# Patient Record
Sex: MALE | Race: NATIVE HAWAIIAN OR OTHER PACIFIC ISLANDER | NOT HISPANIC OR LATINO
[De-identification: names, ages, dates, MRNs, and addresses within clinical notes are randomized per-mention and may not be internally consistent; named-entity substitution may affect disease eponyms.]

---

## 2016-05-17 RX ORDER — OMEPRAZOLE 10 MG/1
1 CAPSULE, DELAYED RELEASE ORAL
Qty: 0 | Refills: 0 | COMMUNITY
Start: 2016-05-17 | End: 2016-06-16

## 2016-05-17 RX ORDER — TERAZOSIN HYDROCHLORIDE 10 MG/1
1 CAPSULE ORAL
Qty: 0 | Refills: 0 | COMMUNITY
Start: 2016-05-17 | End: 2016-06-16

## 2017-08-22 ENCOUNTER — APPOINTMENT (OUTPATIENT)
Dept: CARDIOTHORACIC SURGERY | Facility: CLINIC | Age: 67
End: 2017-08-22

## 2019-02-27 ENCOUNTER — APPOINTMENT (OUTPATIENT)
Dept: UROLOGY | Facility: CLINIC | Age: 69
End: 2019-02-27
Payer: MEDICARE

## 2019-02-27 VITALS
SYSTOLIC BLOOD PRESSURE: 124 MMHG | BODY MASS INDEX: 26.98 KG/M2 | WEIGHT: 158 LBS | RESPIRATION RATE: 16 BRPM | HEART RATE: 78 BPM | HEIGHT: 64 IN | DIASTOLIC BLOOD PRESSURE: 72 MMHG

## 2019-02-27 PROCEDURE — 99204 OFFICE O/P NEW MOD 45 MIN: CPT

## 2019-03-19 NOTE — HISTORY OF PRESENT ILLNESS
[FreeTextEntry1] : cc bph\par pt is  yo male referred for bph . The patient reports frequency, incomplete emptying, intermittency, straining, urgency and weak stream.  . He denies  hematuria and dysuria  The patient states symptoms are of  severity. The symptoms have been present for . He reports a history of no complicating symptoms. He denies flank pain, gross hematuria, kidney stones and recurrent UTI.\par  Prior treatments include terazosin . Results of treatment are poor\par last psa 3.3\par The is no family history of prostate cancer\par eval cysto 2015 large median lobe\par

## 2019-03-19 NOTE — ASSESSMENT
[FreeTextEntry1] : We had a long discussion regarding the urinary symptoms and management options.. Surgical options discussed included photo-vaporization of the prostate also known as Greenlight laser, microwave thermotherapy, transurethral resection of the prostate (TURP) or suprapubic prostatectomy depending on the size of prostate. We also discussed intra-vesical injection of Botox for overactive bladder symptoms. Risks and benefits of each treatment were discussed included but were not limited to worsening urinary symptoms, incontinence, hematuria, erectile dysfunction, ED, retrograde ejaculation, etc. Also, workup with cystoscopy, post-void residual determination and urodynamics study and their indications were reviewed.\par will schedule turp \par

## 2019-04-18 ENCOUNTER — OUTPATIENT (OUTPATIENT)
Dept: OUTPATIENT SERVICES | Facility: HOSPITAL | Age: 69
LOS: 1 days | End: 2019-04-18
Payer: MEDICARE

## 2019-04-18 VITALS
RESPIRATION RATE: 17 BRPM | HEART RATE: 62 BPM | OXYGEN SATURATION: 98 % | DIASTOLIC BLOOD PRESSURE: 70 MMHG | TEMPERATURE: 98 F | HEIGHT: 64 IN | WEIGHT: 149.91 LBS | SYSTOLIC BLOOD PRESSURE: 115 MMHG

## 2019-04-18 DIAGNOSIS — Z98.890 OTHER SPECIFIED POSTPROCEDURAL STATES: Chronic | ICD-10-CM

## 2019-04-18 DIAGNOSIS — Z98.49 CATARACT EXTRACTION STATUS, UNSPECIFIED EYE: Chronic | ICD-10-CM

## 2019-04-18 DIAGNOSIS — N40.1 BENIGN PROSTATIC HYPERPLASIA WITH LOWER URINARY TRACT SYMPTOMS: ICD-10-CM

## 2019-04-18 DIAGNOSIS — Z98.89 OTHER SPECIFIED POSTPROCEDURAL STATES: Chronic | ICD-10-CM

## 2019-04-18 LAB
APPEARANCE UR: CLEAR — SIGNIFICANT CHANGE UP
APTT BLD: 27.9 SEC — SIGNIFICANT CHANGE UP (ref 27.5–36.3)
BILIRUB UR-MCNC: NEGATIVE — SIGNIFICANT CHANGE UP
COLOR SPEC: YELLOW — SIGNIFICANT CHANGE UP
DIFF PNL FLD: NEGATIVE — SIGNIFICANT CHANGE UP
GLUCOSE UR QL: NEGATIVE — SIGNIFICANT CHANGE UP
INR BLD: 1.05 RATIO — SIGNIFICANT CHANGE UP (ref 0.88–1.16)
KETONES UR-MCNC: ABNORMAL
LEUKOCYTE ESTERASE UR-ACNC: NEGATIVE — SIGNIFICANT CHANGE UP
NITRITE UR-MCNC: NEGATIVE — SIGNIFICANT CHANGE UP
PH UR: 6 — SIGNIFICANT CHANGE UP (ref 5–8)
PROT UR-MCNC: 15
PROTHROM AB SERPL-ACNC: 11.7 SEC — SIGNIFICANT CHANGE UP (ref 10–12.9)
SP GR SPEC: 1.01 — SIGNIFICANT CHANGE UP (ref 1.01–1.02)
UROBILINOGEN FLD QL: NEGATIVE — SIGNIFICANT CHANGE UP

## 2019-04-18 PROCEDURE — G0463: CPT

## 2019-04-18 PROCEDURE — 36415 COLL VENOUS BLD VENIPUNCTURE: CPT

## 2019-04-18 PROCEDURE — 81001 URINALYSIS AUTO W/SCOPE: CPT

## 2019-04-18 PROCEDURE — 86900 BLOOD TYPING SEROLOGIC ABO: CPT

## 2019-04-18 PROCEDURE — 85730 THROMBOPLASTIN TIME PARTIAL: CPT

## 2019-04-18 PROCEDURE — 71046 X-RAY EXAM CHEST 2 VIEWS: CPT

## 2019-04-18 PROCEDURE — 86850 RBC ANTIBODY SCREEN: CPT

## 2019-04-18 PROCEDURE — 71046 X-RAY EXAM CHEST 2 VIEWS: CPT | Mod: 26

## 2019-04-18 PROCEDURE — 85610 PROTHROMBIN TIME: CPT

## 2019-04-18 PROCEDURE — 87086 URINE CULTURE/COLONY COUNT: CPT

## 2019-04-18 PROCEDURE — 86901 BLOOD TYPING SEROLOGIC RH(D): CPT

## 2019-04-18 RX ORDER — METOPROLOL TARTRATE 50 MG
0.5 TABLET ORAL
Qty: 0 | Refills: 0 | COMMUNITY

## 2019-04-18 RX ORDER — SODIUM CHLORIDE 9 MG/ML
3 INJECTION INTRAMUSCULAR; INTRAVENOUS; SUBCUTANEOUS EVERY 8 HOURS
Qty: 0 | Refills: 0 | Status: DISCONTINUED | OUTPATIENT
Start: 2019-04-22 | End: 2019-04-23

## 2019-04-18 NOTE — H&P PST ADULT - NSICDXFAMILYHX_GEN_ALL_CORE_FT
FAMILY HISTORY:  Sibling  Still living? Unknown  Family history of diabetes mellitus in brother, Age at diagnosis: Age Unknown  Family history of hyperlipidemia, Age at diagnosis: Age Unknown

## 2019-04-18 NOTE — H&P PST ADULT - NSICDXPASTSURGICALHX_GEN_ALL_CORE_FT
PAST SURGICAL HISTORY:  History of arthroscopic knee surgery Bilateral-2012    S/P cataract extraction bilateral eyes with artificial lenses- 2017    S/P mitral valve repair 2016

## 2019-04-18 NOTE — H&P PST ADULT - NSICDXPROBLEM_GEN_ALL_CORE_FT
PROBLEM DIAGNOSES  Problem: Enlarged prostate with lower urinary tract symptoms (LUTS)  Assessment and Plan: Patient is scheduled for transurethral resection of prostate on 4/22/19.

## 2019-04-18 NOTE — H&P PST ADULT - NEGATIVE ENMT SYMPTOMS
no recurrent cold sores/no vertigo/no nasal congestion/no nasal obstruction/no abnormal taste sensation/no nasal discharge/no post-nasal discharge/no nose bleeds/no dry mouth/no ear pain/no throat pain/no dysphagia/no hearing difficulty/no tinnitus/no sinus symptoms/no gum bleeding

## 2019-04-18 NOTE — H&P PST ADULT - NEGATIVE GENERAL GENITOURINARY SYMPTOMS
no flank pain L/no urine discoloration/no hematuria/no dysuria/no renal colic/no flank pain R/BPH/no bladder infections

## 2019-04-18 NOTE — H&P PST ADULT - NEGATIVE GASTROINTESTINAL SYMPTOMS
no nausea/no vomiting/no abdominal pain/no flatulence/no change in bowel habits/no constipation/no diarrhea

## 2019-04-18 NOTE — H&P PST ADULT - NSICDXPASTMEDICALHX_GEN_ALL_CORE_FT
PAST MEDICAL HISTORY:  BPH (benign prostatic hyperplasia)     Cataract bilateral eyes    Chronic GERD     Diabetes mellitus     Hyperlipidemia     Hypertension     Mitral regurgitation

## 2019-04-18 NOTE — H&P PST ADULT - GASTROINTESTINAL DETAILS
no guarding/nontender/no distention/soft/no masses palpable/bowel sounds normal/no rebound tenderness/normal/no bruit

## 2019-04-18 NOTE — H&P PST ADULT - RS GEN PE MLT RESP DETAILS PC
respirations non-labored/breath sounds equal/no rales/airway patent/clear to auscultation bilaterally/good air movement/no rhonchi/no wheezes

## 2019-04-18 NOTE — H&P PST ADULT - NEGATIVE OPHTHALMOLOGIC SYMPTOMS
no diplopia/no photophobia/no lacrimation R/no blurred vision L/no blurred vision R/no discharge R/no lacrimation L/no discharge L

## 2019-04-18 NOTE — H&P PST ADULT - NEGATIVE CARDIOVASCULAR SYMPTOMS
no palpitations/no claudication/no orthopnea/no paroxysmal nocturnal dyspnea/no peripheral edema/no chest pain/no dyspnea on exertion

## 2019-04-18 NOTE — H&P PST ADULT - HISTORY OF PRESENT ILLNESS
68 years male presented to [facility] for evaluated before surgery, pt was diagnosed with enlarged prostate with lower urinary tract symptoms and is scheduled for transurethral resection of prostate on 4/22/19.

## 2019-04-18 NOTE — H&P PST ADULT - NEGATIVE BREAST SYMPTOMS
no breast tenderness L/no breast tenderness R/no breast lump R/no breast lump L/no nipple discharge L/no nipple discharge R

## 2019-04-18 NOTE — H&P PST ADULT - NEGATIVE NEUROLOGICAL SYMPTOMS
no loss of sensation/no difficulty walking/no syncope/no tremors/no vertigo/no facial palsy/no confusion/no loss of consciousness/no hemiparesis/no transient paralysis/no weakness/no generalized seizures/no paresthesias/no focal seizures/no headache

## 2019-04-19 DIAGNOSIS — Z01.818 ENCOUNTER FOR OTHER PREPROCEDURAL EXAMINATION: ICD-10-CM

## 2019-04-19 PROBLEM — H26.9 UNSPECIFIED CATARACT: Chronic | Status: ACTIVE | Noted: 2019-04-18

## 2019-04-19 LAB
CULTURE RESULTS: NO GROWTH — SIGNIFICANT CHANGE UP
SPECIMEN SOURCE: SIGNIFICANT CHANGE UP

## 2019-04-21 ENCOUNTER — TRANSCRIPTION ENCOUNTER (OUTPATIENT)
Age: 69
End: 2019-04-21

## 2019-04-22 ENCOUNTER — RESULT REVIEW (OUTPATIENT)
Age: 69
End: 2019-04-22

## 2019-04-22 ENCOUNTER — INPATIENT (INPATIENT)
Facility: HOSPITAL | Age: 69
LOS: 0 days | Discharge: HOME | DRG: 714 | End: 2019-04-23
Attending: UROLOGY | Admitting: UROLOGY
Payer: MEDICARE

## 2019-04-22 ENCOUNTER — APPOINTMENT (OUTPATIENT)
Dept: UROLOGY | Facility: HOSPITAL | Age: 69
End: 2019-04-22

## 2019-04-22 VITALS
WEIGHT: 149.91 LBS | TEMPERATURE: 98 F | RESPIRATION RATE: 14 BRPM | OXYGEN SATURATION: 99 % | HEART RATE: 64 BPM | DIASTOLIC BLOOD PRESSURE: 66 MMHG | HEIGHT: 64 IN | SYSTOLIC BLOOD PRESSURE: 114 MMHG

## 2019-04-22 DIAGNOSIS — Z98.89 OTHER SPECIFIED POSTPROCEDURAL STATES: Chronic | ICD-10-CM

## 2019-04-22 DIAGNOSIS — Z98.890 OTHER SPECIFIED POSTPROCEDURAL STATES: Chronic | ICD-10-CM

## 2019-04-22 DIAGNOSIS — Z98.49 CATARACT EXTRACTION STATUS, UNSPECIFIED EYE: Chronic | ICD-10-CM

## 2019-04-22 DIAGNOSIS — N40.1 BENIGN PROSTATIC HYPERPLASIA WITH LOWER URINARY TRACT SYMPTOMS: ICD-10-CM

## 2019-04-22 PROCEDURE — 88305 TISSUE EXAM BY PATHOLOGIST: CPT | Mod: 26

## 2019-04-22 RX ORDER — HEPARIN SODIUM 5000 [USP'U]/ML
5000 INJECTION INTRAVENOUS; SUBCUTANEOUS EVERY 8 HOURS
Qty: 0 | Refills: 0 | Status: DISCONTINUED | OUTPATIENT
Start: 2019-04-22 | End: 2019-04-23

## 2019-04-22 RX ORDER — SODIUM CHLORIDE 9 MG/ML
1000 INJECTION INTRAMUSCULAR; INTRAVENOUS; SUBCUTANEOUS
Qty: 0 | Refills: 0 | Status: DISCONTINUED | OUTPATIENT
Start: 2019-04-22 | End: 2019-04-23

## 2019-04-22 RX ORDER — RAMIPRIL 5 MG
1 CAPSULE ORAL
Qty: 0 | Refills: 0 | COMMUNITY

## 2019-04-22 RX ORDER — OXYCODONE AND ACETAMINOPHEN 5; 325 MG/1; MG/1
1 TABLET ORAL EVERY 6 HOURS
Qty: 0 | Refills: 0 | Status: DISCONTINUED | OUTPATIENT
Start: 2019-04-22 | End: 2019-04-23

## 2019-04-22 RX ORDER — ONDANSETRON 8 MG/1
4 TABLET, FILM COATED ORAL ONCE
Qty: 0 | Refills: 0 | Status: CANCELLED | OUTPATIENT
Start: 2019-04-22 | End: 2019-04-22

## 2019-04-22 RX ORDER — DEXTROSE 50 % IN WATER 50 %
25 SYRINGE (ML) INTRAVENOUS ONCE
Qty: 0 | Refills: 0 | Status: DISCONTINUED | OUTPATIENT
Start: 2019-04-22 | End: 2019-04-23

## 2019-04-22 RX ORDER — DEXTROSE 50 % IN WATER 50 %
15 SYRINGE (ML) INTRAVENOUS ONCE
Qty: 0 | Refills: 0 | Status: DISCONTINUED | OUTPATIENT
Start: 2019-04-22 | End: 2019-04-23

## 2019-04-22 RX ORDER — INSULIN HUMAN 100 [IU]/ML
4 INJECTION, SOLUTION SUBCUTANEOUS ONCE
Qty: 0 | Refills: 0 | Status: COMPLETED | OUTPATIENT
Start: 2019-04-22 | End: 2019-04-22

## 2019-04-22 RX ORDER — INSULIN LISPRO 100/ML
VIAL (ML) SUBCUTANEOUS
Qty: 0 | Refills: 0 | Status: DISCONTINUED | OUTPATIENT
Start: 2019-04-22 | End: 2019-04-23

## 2019-04-22 RX ORDER — PANTOPRAZOLE SODIUM 20 MG/1
40 TABLET, DELAYED RELEASE ORAL
Qty: 0 | Refills: 0 | Status: DISCONTINUED | OUTPATIENT
Start: 2019-04-22 | End: 2019-04-23

## 2019-04-22 RX ORDER — ACETAMINOPHEN 500 MG
650 TABLET ORAL EVERY 6 HOURS
Qty: 0 | Refills: 0 | Status: DISCONTINUED | OUTPATIENT
Start: 2019-04-22 | End: 2019-04-23

## 2019-04-22 RX ORDER — METOPROLOL TARTRATE 50 MG
25 TABLET ORAL
Qty: 0 | Refills: 0 | Status: DISCONTINUED | OUTPATIENT
Start: 2019-04-22 | End: 2019-04-22

## 2019-04-22 RX ORDER — MULTIVIT-MIN/FERROUS GLUCONATE 9 MG/15 ML
1 LIQUID (ML) ORAL
Qty: 0 | Refills: 0 | COMMUNITY

## 2019-04-22 RX ORDER — METOPROLOL TARTRATE 50 MG
12.5 TABLET ORAL
Qty: 0 | Refills: 0 | Status: DISCONTINUED | OUTPATIENT
Start: 2019-04-22 | End: 2019-04-23

## 2019-04-22 RX ORDER — LISINOPRIL 2.5 MG/1
10 TABLET ORAL DAILY
Qty: 0 | Refills: 0 | Status: DISCONTINUED | OUTPATIENT
Start: 2019-04-22 | End: 2019-04-23

## 2019-04-22 RX ORDER — DEXTROSE 50 % IN WATER 50 %
12.5 SYRINGE (ML) INTRAVENOUS ONCE
Qty: 0 | Refills: 0 | Status: DISCONTINUED | OUTPATIENT
Start: 2019-04-22 | End: 2019-04-23

## 2019-04-22 RX ORDER — HYDROMORPHONE HYDROCHLORIDE 2 MG/ML
0.5 INJECTION INTRAMUSCULAR; INTRAVENOUS; SUBCUTANEOUS
Qty: 0 | Refills: 0 | Status: CANCELLED | OUTPATIENT
Start: 2019-04-22 | End: 2019-04-22

## 2019-04-22 RX ORDER — CIPROFLOXACIN LACTATE 400MG/40ML
400 VIAL (ML) INTRAVENOUS EVERY 12 HOURS
Qty: 0 | Refills: 0 | Status: DISCONTINUED | OUTPATIENT
Start: 2019-04-22 | End: 2019-04-23

## 2019-04-22 RX ORDER — METOPROLOL TARTRATE 50 MG
0.5 TABLET ORAL
Qty: 0 | Refills: 0 | COMMUNITY

## 2019-04-22 RX ORDER — SODIUM CHLORIDE 9 MG/ML
1000 INJECTION, SOLUTION INTRAVENOUS
Qty: 0 | Refills: 0 | Status: DISCONTINUED | OUTPATIENT
Start: 2019-04-22 | End: 2019-04-23

## 2019-04-22 RX ORDER — SITAGLIPTIN AND METFORMIN HYDROCHLORIDE 500; 50 MG/1; MG/1
1 TABLET, FILM COATED ORAL
Qty: 0 | Refills: 0 | COMMUNITY

## 2019-04-22 RX ORDER — LOVASTATIN 20 MG
1 TABLET ORAL
Qty: 0 | Refills: 0 | COMMUNITY

## 2019-04-22 RX ORDER — GLUCAGON INJECTION, SOLUTION 0.5 MG/.1ML
1 INJECTION, SOLUTION SUBCUTANEOUS ONCE
Qty: 0 | Refills: 0 | Status: DISCONTINUED | OUTPATIENT
Start: 2019-04-22 | End: 2019-04-23

## 2019-04-22 RX ADMIN — OXYCODONE AND ACETAMINOPHEN 1 TABLET(S): 5; 325 TABLET ORAL at 18:25

## 2019-04-22 RX ADMIN — Medication 200 MILLIGRAM(S): at 17:41

## 2019-04-22 RX ADMIN — Medication 1: at 17:09

## 2019-04-22 RX ADMIN — SODIUM CHLORIDE 90 MILLILITER(S): 9 INJECTION INTRAMUSCULAR; INTRAVENOUS; SUBCUTANEOUS at 21:26

## 2019-04-22 RX ADMIN — SODIUM CHLORIDE 3 MILLILITER(S): 9 INJECTION INTRAMUSCULAR; INTRAVENOUS; SUBCUTANEOUS at 16:08

## 2019-04-22 RX ADMIN — SODIUM CHLORIDE 3 MILLILITER(S): 9 INJECTION INTRAMUSCULAR; INTRAVENOUS; SUBCUTANEOUS at 21:25

## 2019-04-22 RX ADMIN — OXYCODONE AND ACETAMINOPHEN 1 TABLET(S): 5; 325 TABLET ORAL at 17:25

## 2019-04-22 RX ADMIN — INSULIN HUMAN 4 UNIT(S): 100 INJECTION, SOLUTION SUBCUTANEOUS at 22:24

## 2019-04-22 RX ADMIN — SODIUM CHLORIDE 90 MILLILITER(S): 9 INJECTION INTRAMUSCULAR; INTRAVENOUS; SUBCUTANEOUS at 17:41

## 2019-04-22 RX ADMIN — HEPARIN SODIUM 5000 UNIT(S): 5000 INJECTION INTRAVENOUS; SUBCUTANEOUS at 21:27

## 2019-04-22 NOTE — ASU PATIENT PROFILE, ADULT - PMH
BPH (benign prostatic hyperplasia)    Cataract  bilateral eyes  Chronic GERD    Diabetes mellitus    Hyperlipidemia    Hypertension    Mitral regurgitation

## 2019-04-22 NOTE — PROGRESS NOTE ADULT - SUBJECTIVE AND OBJECTIVE BOX
POSTOP NOTE    Patient is doing okay.      Vital Signs Last 24 Hrs  T(C): 36.3 (22 Apr 2019 16:11), Max: 36.6 (22 Apr 2019 10:57)  T(F): 97.4 (22 Apr 2019 16:11), Max: 97.9 (22 Apr 2019 10:57)  HR: 75 (22 Apr 2019 20:31) (64 - 87)  BP: 105/58 (22 Apr 2019 20:31) (105/58 - 120/70)  BP(mean): 87 (22 Apr 2019 15:10) (78 - 87)  RR: 16 (22 Apr 2019 16:11) (14 - 19)  SpO2: 97% (22 Apr 2019 16:11) (97% - 100%)    Daily Height in cm: 162.56 (22 Apr 2019 10:57)    Daily     I&O's Detail    22 Apr 2019 07:01  -  22 Apr 2019 21:20  --------------------------------------------------------  IN:    Lactated Ringers IV Bolus: 700 mL  Total IN: 700 mL    OUT:    Continuous Bladder Irrigation: 4000 mL  Total OUT: 4000 mL    Total NET: -3300 mL          MEDICATIONS  (STANDING):  ciprofloxacin   IVPB 400 milliGRAM(s) IV Intermittent every 12 hours  dextrose 5%. 1000 milliLiter(s) (50 mL/Hr) IV Continuous <Continuous>  dextrose 50% Injectable 12.5 Gram(s) IV Push once  dextrose 50% Injectable 25 Gram(s) IV Push once  dextrose 50% Injectable 25 Gram(s) IV Push once  heparin  Injectable 5000 Unit(s) SubCutaneous every 8 hours  insulin lispro (HumaLOG) corrective regimen sliding scale   SubCutaneous three times a day before meals  lisinopril 10 milliGRAM(s) Oral daily  metoprolol tartrate 12.5 milliGRAM(s) Oral two times a day  pantoprazole    Tablet 40 milliGRAM(s) Oral before breakfast  sodium chloride 0.9% lock flush 3 milliLiter(s) IV Push every 8 hours  sodium chloride 0.9%. 1000 milliLiter(s) (90 mL/Hr) IV Continuous <Continuous>    MEDICATIONS  (PRN):  acetaminophen   Tablet .. 650 milliGRAM(s) Oral every 6 hours PRN Mild Pain (1 - 3)  dextrose 40% Gel 15 Gram(s) Oral once PRN Blood Glucose LESS THAN 70 milliGRAM(s)/deciliter  glucagon  Injectable 1 milliGRAM(s) IntraMuscular once PRN Glucose LESS THAN 70 milligrams/deciliter  oxyCODONE    5 mG/acetaminophen 325 mG 1 Tablet(s) Oral every 6 hours PRN Moderate Pain (4 - 6)      PHYSICAL EXAM:-  CONSTITIONAL/GENERAL: In no acute distress  RESPIRATORY/CHEST: Clear to percussion bilaterally; Normal respiratory effort  CARDIOVASCULAR/HEART: Regular rate and rhythm  GASTROINTESTINAL/ABDOMEN: Soft, Nontender, Nondistended; Bowel sounds present  MUSCULOSKELETAL/EXTREMITIES:  No clubbing, cyanosis, or edema  CBI running, Gamez bag blood tinged urine    LABS:-    Neutrophil %:               A/P    S/P TURP  Doing well  Continue CBI & Gamez  IV Abx  Postop care

## 2019-04-22 NOTE — ASU PATIENT PROFILE, ADULT - PSH
History of arthroscopic knee surgery  Bilateral-2012  S/P cataract extraction  bilateral eyes with artificial lenses- 2017  S/P mitral valve repair  2016

## 2019-04-22 NOTE — PATIENT PROFILE ADULT - VISION (WITH CORRECTIVE LENSES IF THE PATIENT USUALLY WEARS THEM):
Normal vision: sees adequately in most situations; can see medication labels, newsprint bilateral cataract surgery 2018/Partially impaired: cannot see medication labels or newsprint, but can see obstacles in path, and the surrounding layout; can count fingers at arm's length

## 2019-04-23 ENCOUNTER — TRANSCRIPTION ENCOUNTER (OUTPATIENT)
Age: 69
End: 2019-04-23

## 2019-04-23 VITALS
TEMPERATURE: 98 F | DIASTOLIC BLOOD PRESSURE: 61 MMHG | RESPIRATION RATE: 16 BRPM | HEART RATE: 89 BPM | OXYGEN SATURATION: 99 % | SYSTOLIC BLOOD PRESSURE: 104 MMHG

## 2019-04-23 PROBLEM — E11.9 TYPE 2 DIABETES MELLITUS WITHOUT COMPLICATIONS: Chronic | Status: ACTIVE | Noted: 2019-04-18

## 2019-04-23 PROBLEM — K21.9 GASTRO-ESOPHAGEAL REFLUX DISEASE WITHOUT ESOPHAGITIS: Chronic | Status: ACTIVE | Noted: 2019-04-18

## 2019-04-23 PROCEDURE — 36415 COLL VENOUS BLD VENIPUNCTURE: CPT

## 2019-04-23 PROCEDURE — 86901 BLOOD TYPING SEROLOGIC RH(D): CPT

## 2019-04-23 PROCEDURE — 86850 RBC ANTIBODY SCREEN: CPT

## 2019-04-23 PROCEDURE — 88305 TISSUE EXAM BY PATHOLOGIST: CPT

## 2019-04-23 PROCEDURE — 82962 GLUCOSE BLOOD TEST: CPT

## 2019-04-23 PROCEDURE — 86900 BLOOD TYPING SEROLOGIC ABO: CPT

## 2019-04-23 RX ORDER — MOXIFLOXACIN HYDROCHLORIDE TABLETS, 400 MG 400 MG/1
1 TABLET, FILM COATED ORAL
Qty: 4 | Refills: 0 | OUTPATIENT
Start: 2019-04-23 | End: 2019-04-24

## 2019-04-23 RX ADMIN — SODIUM CHLORIDE 3 MILLILITER(S): 9 INJECTION INTRAMUSCULAR; INTRAVENOUS; SUBCUTANEOUS at 14:03

## 2019-04-23 RX ADMIN — HEPARIN SODIUM 5000 UNIT(S): 5000 INJECTION INTRAVENOUS; SUBCUTANEOUS at 05:43

## 2019-04-23 RX ADMIN — Medication 12.5 MILLIGRAM(S): at 05:44

## 2019-04-23 RX ADMIN — Medication 2: at 08:06

## 2019-04-23 RX ADMIN — SODIUM CHLORIDE 3 MILLILITER(S): 9 INJECTION INTRAMUSCULAR; INTRAVENOUS; SUBCUTANEOUS at 05:44

## 2019-04-23 RX ADMIN — Medication 200 MILLIGRAM(S): at 05:42

## 2019-04-23 RX ADMIN — LISINOPRIL 10 MILLIGRAM(S): 2.5 TABLET ORAL at 05:43

## 2019-04-23 RX ADMIN — HEPARIN SODIUM 5000 UNIT(S): 5000 INJECTION INTRAVENOUS; SUBCUTANEOUS at 14:03

## 2019-04-23 RX ADMIN — PANTOPRAZOLE SODIUM 40 MILLIGRAM(S): 20 TABLET, DELAYED RELEASE ORAL at 08:08

## 2019-04-23 RX ADMIN — Medication 2: at 12:02

## 2019-04-23 NOTE — DISCHARGE NOTE NURSING/CASE MANAGEMENT/SOCIAL WORK - NSDCPEPTCAREGIVEDUMATLIST _GEN_ALL_CORE
Bedside and Verbal shift change report given to Fernando Louise RN (oncoming nurse) by Estelita Avery RN (offgoing nurse). Report included the following information SBAR, Kardex, ED Summary, Intake/Output, MAR and Recent Results. Influenza Vaccination/Diabetes

## 2019-04-23 NOTE — DISCHARGE NOTE PROVIDER - CARE PROVIDER_API CALL
Toro Wallace (MD)  Urology  [address], [address]  [place] [address]  Phone: [phone]  Fax: [phone]  Follow Up Time:

## 2019-04-23 NOTE — DISCHARGE NOTE PROVIDER - HOSPITAL COURSE
68 year old male presented for elective schedule TURP due to BPH with LUTS. Patient was admitted to monitoring postoperatively. He was kept on CBI overnight which was clamped in the morning. He was cleared for discharge when his urine remained clear and his pain was well controlled.

## 2019-04-23 NOTE — DISCHARGE NOTE PROVIDER - NSDCCPCAREPLAN_GEN_ALL_CORE_FT
PRINCIPAL DISCHARGE DIAGNOSIS  Diagnosis: BPH with obstruction/lower urinary tract symptoms  Assessment and Plan of Treatment: Continue to take Cipro every 12 hours for the next 2 days. Please keep ojeda catheter in place. Empty collection bag as needed. Please call if severe lower abdominal pain and unable to pass urine through catheter.      SECONDARY DISCHARGE DIAGNOSES  Diagnosis: Hypertension  Assessment and Plan of Treatment: Continue home medication    Diagnosis: Diabetes  Assessment and Plan of Treatment: Continue home medications

## 2019-04-23 NOTE — PROGRESS NOTE ADULT - ASSESSMENT
68y Male s/p TURP POD#1 secondary to Benign prostatic hyperplasia with lower urinary tract symptoms. Stable  PMH Cataract, Diabetes mellitus, Hyperlipidemia, Hypertension    Plan:  -continue ojeda catheter, urine output monitoring and trend renal function  -CBI clamped, will restart if bleeding/ clotting  -follow up AM labs  -discharge planning later today if urine remains clear

## 2019-04-23 NOTE — PROGRESS NOTE ADULT - SUBJECTIVE AND OBJECTIVE BOX
S/p TURP POD#1  Patient seen and examined bedside resting comfortably.  Admits to slight penile pain.      T(F): 98.3 (04-23-19 @ 05:34), Max: 98.3 (04-23-19 @ 05:34)  HR: 66 (04-23-19 @ 05:34) (64 - 87)  BP: 110/57 (04-23-19 @ 05:34) (104/51 - 120/70)  RR: 16 (04-23-19 @ 05:34) (14 - 19)  SpO2: 97% (04-23-19 @ 05:34) (96% - 100%)  POCT Blood Glucose.: 201 mg/dL (23 Apr 2019 07:49)  POCT Blood Glucose.: 226 mg/dL (23 Apr 2019 03:43)  POCT Blood Glucose.: 332 mg/dL (22 Apr 2019 21:42)  POCT Blood Glucose.: 172 mg/dL (22 Apr 2019 14:48)  POCT Blood Glucose.: 184 mg/dL (22 Apr 2019 11:00)    PHYSICAL EXAM:  General: NAD, WDWN  Neuro:  Alert & oriented x 3  HEENT: NCAT, EOMI, conjunctiva clear  CV: +S1S2 regular rate and rhythm  Lung: respirations nonlabored, good inspiratory effort  Abdomen: soft, NTND  Extremities: no pedal edema or calf tenderness noted   : three way ojeda in place, CBI stopped. Urine in tubing clear yellow without evidence of bleeding or clots    LABS:    I&O's Detail    22 Apr 2019 07:01  -  23 Apr 2019 07:00  --------------------------------------------------------  IN:    Lactated Ringers IV Bolus: 700 mL  Total IN: 700 mL    OUT:    Continuous Bladder Irrigation: 62619 mL  Total OUT: 94946 mL    Total NET: -80296 mL    23 Apr 2019 07:01  -  23 Apr 2019 08:13  --------------------------------------------------------  IN:    Continuous Bladder Irrigation: 2000 mL  Total IN: 2000 mL    OUT:  Total OUT: 0 mL    Total NET: 2000 mL

## 2019-04-23 NOTE — DISCHARGE NOTE NURSING/CASE MANAGEMENT/SOCIAL WORK - NSDCDPATPORTLINK_GEN_ALL_CORE
You can access the [email or web address], offered by [facility], by registering with the following website: [email or web address]

## 2019-04-24 ENCOUNTER — APPOINTMENT (OUTPATIENT)
Dept: UROLOGY | Facility: CLINIC | Age: 69
End: 2019-04-24
Payer: MEDICARE

## 2019-04-24 ENCOUNTER — MOBILE ON CALL (OUTPATIENT)
Age: 69
End: 2019-04-24

## 2019-04-24 VITALS
SYSTOLIC BLOOD PRESSURE: 131 MMHG | BODY MASS INDEX: 26.98 KG/M2 | DIASTOLIC BLOOD PRESSURE: 77 MMHG | WEIGHT: 158 LBS | HEIGHT: 64 IN | HEART RATE: 118 BPM

## 2019-04-24 PROCEDURE — 99024 POSTOP FOLLOW-UP VISIT: CPT

## 2019-04-24 NOTE — HISTORY OF PRESENT ILLNESS
[FreeTextEntry1] : s/p turp \par doing well\par ojeda clear urine \par \par bladder filled ojeda removed \par pt voided well\par cont meds \par call for difficulty voiding \par f/u next week

## 2019-04-25 ENCOUNTER — APPOINTMENT (OUTPATIENT)
Age: 69
End: 2019-04-25
Payer: MEDICARE

## 2019-04-25 VITALS
DIASTOLIC BLOOD PRESSURE: 75 MMHG | TEMPERATURE: 97.7 F | BODY MASS INDEX: 26.98 KG/M2 | HEART RATE: 105 BPM | RESPIRATION RATE: 16 BRPM | HEIGHT: 64 IN | SYSTOLIC BLOOD PRESSURE: 118 MMHG | WEIGHT: 158 LBS

## 2019-04-25 DIAGNOSIS — K59.09 OTHER CONSTIPATION: ICD-10-CM

## 2019-04-25 DIAGNOSIS — R30.0 DYSURIA: ICD-10-CM

## 2019-04-25 PROCEDURE — 99024 POSTOP FOLLOW-UP VISIT: CPT

## 2019-04-25 RX ORDER — SENNOSIDES 8.6 MG TABLETS 8.6 MG/1
8.6 TABLET ORAL TWICE DAILY
Qty: 60 | Refills: 3 | Status: ACTIVE | COMMUNITY
Start: 2019-04-25 | End: 1900-01-01

## 2019-04-25 RX ORDER — PHENAZOPYRIDINE HYDROCHLORIDE 100 MG/1
100 TABLET ORAL 3 TIMES DAILY
Qty: 30 | Refills: 0 | Status: ACTIVE | COMMUNITY
Start: 2019-04-25 | End: 1900-01-01

## 2019-04-25 NOTE — HISTORY OF PRESENT ILLNESS
[FreeTextEntry1] : 69 yo M s/p TURP. Passed TOV yesterday\par Presents with history of urinary frequency, weak stream, straining, \par No gross hematuria, no fever, chills\par Small bm this morning but no other BM for the last 3 days\par Not taking any narcotics

## 2019-04-25 NOTE — ASSESSMENT
[FreeTextEntry1] : 69 yo M s/p TURP, LUTS, dysuria\par \par - PVR 140ml\par - UA\par - pyridium\par - senna\par - FU next week with Dr. Wallace

## 2019-04-25 NOTE — PHYSICAL EXAM
[General Appearance - Well Developed] : well developed [General Appearance - Well Nourished] : well nourished [Normal Appearance] : normal appearance [Well Groomed] : well groomed [General Appearance - In No Acute Distress] : no acute distress [Abdomen Soft] : soft [Abdomen Tenderness] : non-tender [Costovertebral Angle Tenderness] : no ~M costovertebral angle tenderness [Urinary Bladder Findings] : the bladder was normal on palpation [Edema] : no peripheral edema [] : no respiratory distress [Exaggerated Use Of Accessory Muscles For Inspiration] : no accessory muscle use [Respiration, Rhythm And Depth] : normal respiratory rhythm and effort [Oriented To Time, Place, And Person] : oriented to person, place, and time [Mood] : the mood was normal [Affect] : the affect was normal [Not Anxious] : not anxious [Normal Station and Gait] : the gait and station were normal for the patient's age [No Focal Deficits] : no focal deficits [No Palpable Adenopathy] : no palpable adenopathy

## 2019-05-01 LAB
APPEARANCE: ABNORMAL
BACTERIA UR CULT: NORMAL
BACTERIA: NEGATIVE
BILIRUBIN URINE: NEGATIVE
BLOOD URINE: ABNORMAL
COLOR: YELLOW
GLUCOSE QUALITATIVE U: ABNORMAL
HYALINE CASTS: 5 /LPF
KETONES URINE: NORMAL
LEUKOCYTE ESTERASE URINE: ABNORMAL
MICROSCOPIC-UA: NORMAL
NITRITE URINE: NEGATIVE
PH URINE: 6
PROTEIN URINE: ABNORMAL
RED BLOOD CELLS URINE: >720 /HPF
SPECIFIC GRAVITY URINE: 1.01
SQUAMOUS EPITHELIAL CELLS: 0 /HPF
UROBILINOGEN URINE: NORMAL
WHITE BLOOD CELLS URINE: 156 /HPF

## 2019-05-02 ENCOUNTER — APPOINTMENT (OUTPATIENT)
Dept: UROLOGY | Facility: CLINIC | Age: 69
End: 2019-05-02
Payer: MEDICARE

## 2019-05-02 VITALS
SYSTOLIC BLOOD PRESSURE: 138 MMHG | BODY MASS INDEX: 26.98 KG/M2 | HEIGHT: 64 IN | HEART RATE: 100 BPM | DIASTOLIC BLOOD PRESSURE: 76 MMHG | WEIGHT: 158 LBS

## 2019-05-02 PROCEDURE — 99024 POSTOP FOLLOW-UP VISIT: CPT

## 2019-05-02 RX ORDER — PHENAZOPYRIDINE HYDROCHLORIDE 100 MG/1
100 TABLET ORAL 3 TIMES DAILY
Qty: 21 | Refills: 0 | Status: ACTIVE | COMMUNITY
Start: 2019-05-02 | End: 1900-01-01

## 2019-05-16 ENCOUNTER — APPOINTMENT (OUTPATIENT)
Dept: UROLOGY | Facility: CLINIC | Age: 69
End: 2019-05-16
Payer: MEDICARE

## 2019-05-16 VITALS — HEART RATE: 74 BPM | SYSTOLIC BLOOD PRESSURE: 104 MMHG | RESPIRATION RATE: 16 BRPM | DIASTOLIC BLOOD PRESSURE: 65 MMHG

## 2019-05-16 PROCEDURE — 99024 POSTOP FOLLOW-UP VISIT: CPT

## 2019-05-16 NOTE — HISTORY OF PRESENT ILLNESS
[FreeTextEntry1] : s/p turp \par c/o some hematuria frequency urgency dysuria \par \par nad\par abd soft nt nd\par bladder scan minimal\par \par reassurance given\par pyridium and myrbetriq given\par f/u 2 weeks

## 2019-05-16 NOTE — HISTORY OF PRESENT ILLNESS
[FreeTextEntry1] : s/p turp \par doing much better \par voidng well\par minimal discomfort \par \par nad\par abd soft nt nd\par bladder scan minimal\par doing well\par f/u 3-4 months

## 2019-08-01 ENCOUNTER — APPOINTMENT (OUTPATIENT)
Dept: UROLOGY | Facility: CLINIC | Age: 69
End: 2019-08-01
Payer: MEDICARE

## 2019-08-01 VITALS
DIASTOLIC BLOOD PRESSURE: 71 MMHG | OXYGEN SATURATION: 99 % | TEMPERATURE: 98.1 F | SYSTOLIC BLOOD PRESSURE: 119 MMHG | HEIGHT: 64 IN | WEIGHT: 158 LBS | HEART RATE: 75 BPM | BODY MASS INDEX: 26.98 KG/M2

## 2019-08-01 PROCEDURE — 99213 OFFICE O/P EST LOW 20 MIN: CPT

## 2019-08-01 NOTE — HISTORY OF PRESENT ILLNESS
[FreeTextEntry1] : s/p turp \par doing much better \par voidng well\par minimal urgency \par no dysuria \par good flow \par \par

## 2019-08-01 NOTE — ASSESSMENT
[FreeTextEntry1] : voiding well\par bothered by urgency but still improving \par mikey reviewed\par trial of tolterodine

## 2019-10-03 ENCOUNTER — MEDICATION RENEWAL (OUTPATIENT)
Age: 69
End: 2019-10-03

## 2019-10-23 ENCOUNTER — MEDICATION RENEWAL (OUTPATIENT)
Age: 69
End: 2019-10-23

## 2019-10-23 RX ORDER — TOLTERODINE TARTRATE 2 MG/1
2 CAPSULE, EXTENDED RELEASE ORAL
Qty: 30 | Refills: 1 | Status: ACTIVE | COMMUNITY
Start: 2019-08-01 | End: 1900-01-01

## 2020-11-10 ENCOUNTER — APPOINTMENT (OUTPATIENT)
Dept: UROLOGY | Facility: CLINIC | Age: 70
End: 2020-11-10
Payer: MEDICARE

## 2020-11-10 VITALS
BODY MASS INDEX: 26.98 KG/M2 | HEART RATE: 78 BPM | HEIGHT: 64 IN | SYSTOLIC BLOOD PRESSURE: 118 MMHG | DIASTOLIC BLOOD PRESSURE: 79 MMHG | TEMPERATURE: 97.6 F | WEIGHT: 158 LBS

## 2020-11-10 PROCEDURE — 99214 OFFICE O/P EST MOD 30 MIN: CPT

## 2020-11-10 NOTE — HISTORY OF PRESENT ILLNESS
[FreeTextEntry1] : cc difficulty voiding \par 69 yo male s/p turp 2019\par last few months c/o slow flow sp discomfort \par no dysuria or hematuria \par went to urgent care in [place] neg ucx and ct\par \par

## 2020-11-18 ENCOUNTER — APPOINTMENT (OUTPATIENT)
Dept: UROLOGY | Facility: CLINIC | Age: 70
End: 2020-11-18
Payer: MEDICARE

## 2020-11-18 VITALS
HEIGHT: 64 IN | DIASTOLIC BLOOD PRESSURE: 71 MMHG | WEIGHT: 158 LBS | SYSTOLIC BLOOD PRESSURE: 126 MMHG | HEART RATE: 66 BPM | BODY MASS INDEX: 26.98 KG/M2 | TEMPERATURE: 97.7 F

## 2020-11-18 DIAGNOSIS — N40.1 BENIGN PROSTATIC HYPERPLASIA WITH LOWER URINARY TRACT SYMPMS: ICD-10-CM

## 2020-11-18 DIAGNOSIS — N13.8 BENIGN PROSTATIC HYPERPLASIA WITH LOWER URINARY TRACT SYMPMS: ICD-10-CM

## 2020-11-18 PROCEDURE — 52000 CYSTOURETHROSCOPY: CPT

## 2020-11-18 RX ORDER — FINASTERIDE 5 MG/1
5 TABLET, FILM COATED ORAL
Qty: 90 | Refills: 1 | Status: ACTIVE | COMMUNITY
Start: 2020-11-18 | End: 1900-01-01

## 2021-02-03 ENCOUNTER — RX RENEWAL (OUTPATIENT)
Age: 71
End: 2021-02-03

## 2021-10-18 ENCOUNTER — RX RENEWAL (OUTPATIENT)
Age: 71
End: 2021-10-18

## 2021-10-18 RX ORDER — TAMSULOSIN HYDROCHLORIDE 0.4 MG/1
0.4 CAPSULE ORAL
Qty: 90 | Refills: 0 | Status: ACTIVE | COMMUNITY
Start: 2020-11-10 | End: 1900-01-01

## 2022-04-11 ENCOUNTER — APPOINTMENT (OUTPATIENT)
Dept: ORTHOPEDIC SURGERY | Facility: CLINIC | Age: 72
End: 2022-04-11

## 2023-04-17 ENCOUNTER — APPOINTMENT (OUTPATIENT)
Dept: ORTHOPEDIC SURGERY | Facility: CLINIC | Age: 73
End: 2023-04-17
Payer: MEDICARE

## 2023-04-17 DIAGNOSIS — M65.341 TRIGGER FINGER, RIGHT RING FINGER: ICD-10-CM

## 2023-04-17 DIAGNOSIS — M65.342 TRIGGER FINGER, LEFT RING FINGER: ICD-10-CM

## 2023-04-17 PROCEDURE — 20550 NJX 1 TENDON SHEATH/LIGAMENT: CPT | Mod: LT

## 2023-04-17 PROCEDURE — 73130 X-RAY EXAM OF HAND: CPT | Mod: LT

## 2023-04-17 PROCEDURE — 99214 OFFICE O/P EST MOD 30 MIN: CPT | Mod: 25

## 2023-04-23 PROBLEM — M65.341 TRIGGER RING FINGER OF RIGHT HAND: Status: ACTIVE | Noted: 2023-04-17

## 2023-04-23 PROBLEM — M65.342 ACQUIRED TRIGGER FINGER OF LEFT RING FINGER: Status: ACTIVE | Noted: 2023-04-23

## 2023-04-23 NOTE — ASSESSMENT
[FreeTextEntry1] : The patient was advised of the diagnosis. The natural history of the pathology was explained in full to the patient in layman's terms. All questions were answered. The risks and benefits of surgical and non-surgical treatment alternatives were explained in full to the patient.\par \par Right ring trigger finger CSI performed today.\par RTO in 2-3 weeks for fu care.

## 2023-04-23 NOTE — IMAGING
[Right] : right hand [There are no fractures, subluxations or dislocations. No significant abnormalities are seen] : There are no fractures, subluxations or dislocations. No significant abnormalities are seen [de-identified] : right ring finger TTP a1 pulley +triggering otherwise farom neurovascular intact distally\par

## 2023-04-23 NOTE — HISTORY OF PRESENT ILLNESS
[4] : 4 [Intermittent] : intermittent [Ice] : ice [Heat] : heat [de-identified] : 4/17/2023: rhd 73 yo male here with left ring finger stiffness worse in the AM.\par Pt denies hx of trauma.\par Pt denies numbness/tingling.\par \par PMH: DM2, htn, BPH. GERD. \par Allergies: NKDA. [] : no [FreeTextEntry6] : stiffness  [de-identified] : making a fist

## 2023-05-01 ENCOUNTER — APPOINTMENT (OUTPATIENT)
Dept: ORTHOPEDIC SURGERY | Facility: CLINIC | Age: 73
End: 2023-05-01

## 2023-05-22 NOTE — DISCHARGE NOTE NURSING/CASE MANAGEMENT/SOCIAL WORK - CAREGIVER ADDRESS
Our goal is to partner with you to improve your health and well being. It is important for you to complete necessary testing and follow the instructions given to you at your clinic visit. Our office will call you within 2 weeks with results of any testing but you may also call sooner to obtain results [phone].   If you have any questions or concerns please feel free to call.  We take your care very seriously and we thank you for your trust!
- labs today
- restart the eliquis
- labs in 1 week 
- continue the iron 1 pill twice a day
- start a stool softener 1 pill once a day (colace)
- follow up in clinic in 1 month with Salome Lemon or Esme.  At that time we will schedule you for colonscopy in july or august. 
- if you have black tarry stool or other signs of bleeding stop eliquis, let us know and go to ther ER same as patient

## 2024-03-26 ENCOUNTER — APPOINTMENT (OUTPATIENT)
Dept: ORTHOPEDIC SURGERY | Facility: CLINIC | Age: 74
End: 2024-03-26
Payer: MEDICARE

## 2024-03-26 DIAGNOSIS — M65.332 TRIGGER FINGER, LEFT MIDDLE FINGER: ICD-10-CM

## 2024-03-26 PROCEDURE — 99213 OFFICE O/P EST LOW 20 MIN: CPT | Mod: 25

## 2024-03-26 PROCEDURE — 20550 NJX 1 TENDON SHEATH/LIGAMENT: CPT | Mod: LT

## 2024-03-26 NOTE — HISTORY OF PRESENT ILLNESS
[de-identified] : 03/26/2024: Patient is here for evaluation of left middle finger. Patient reports trigger finger in the left middle finger and is interested in injection today. There is no hx of trauma.  PMH:  Allergies: NKDA

## 2024-03-26 NOTE — IMAGING
[de-identified] : left middle finger TTP a1 pulley +triggering otherwise farom NVID  xrays deferred today.

## 2024-03-26 NOTE — ASSESSMENT
[FreeTextEntry1] : The patient was advised of the diagnosis. The natural history of the pathology was explained in full to the patient in layman's terms. All questions were answered. The risks and benefits of surgical and non-surgical treatment alternatives were explained in full to the patient.  Pt provided left middle trigger finger CSI #1 today We reviewed the anatomy of the flexor sheath and pathology of trigger fingers with the use of drawings and discussion.  We discussed the treatment options including splinting/nsaids, injection and surgery.  We discussed that too many injections may lead to weakening o the tendon/tendon rupture and the safety of two injections. After a discussion of the risks, benefits and alternatives along with the expectations, the patient was amenable to injection.  The indication for injection is pain and inflammation.  The skin overlying the tendon sheath/A1 pulley site was prepared with alcohol and ethyl chloride was sprayed topically.  Sterile technique was used. An injection of 1ml of lidocaine and 6mg of betamethasone was used.  The patient was instructed to call if redness, pain or fever occur.  They ay apply ice for 15 minutes eery hour for the next 12-24 hours as tolerated.  .  The patient understands that it may take 2-5 days to see a noticeable difference.  Sterile Band-Aid was applied.  rto in 6 weeks.   discussed hyperglycemia after injection and tx options.  The patient will monitor and report to PCP.

## 2024-04-02 ENCOUNTER — APPOINTMENT (OUTPATIENT)
Dept: ORTHOPEDIC SURGERY | Facility: CLINIC | Age: 74
End: 2024-04-02

## 2024-09-17 ENCOUNTER — APPOINTMENT (OUTPATIENT)
Dept: ORTHOPEDIC SURGERY | Facility: CLINIC | Age: 74
End: 2024-09-17
Payer: MEDICARE

## 2024-09-17 DIAGNOSIS — M65.332 TRIGGER FINGER, LEFT MIDDLE FINGER: ICD-10-CM

## 2024-09-17 PROCEDURE — 99213 OFFICE O/P EST LOW 20 MIN: CPT | Mod: 25

## 2024-09-17 PROCEDURE — 20550 NJX 1 TENDON SHEATH/LIGAMENT: CPT | Mod: F2

## 2024-09-17 NOTE — IMAGING
[de-identified] : left middle finger TTP a1 pulley +triggering otherwise farom NVID  xrays deferred today.

## 2024-09-17 NOTE — ASSESSMENT
[FreeTextEntry1] : The patient was advised of the diagnosis. The natural history of the pathology was explained in full to the patient in layman's terms. All questions were answered. The risks and benefits of surgical and non-surgical treatment alternatives were explained in full to the patient.  We reviewed the anatomy of the flexor sheath and pathology of trigger fingers with the use of drawings and discussion.  We discussed the treatment options including splinting/nsaids, injection and surgery.  We discussed that too many injections may lead to weakening o the tendon/tendon rupture and the safety of two injections. After a discussion of the risks, benefits and alternatives along with the expectations, the patient was amenable to injection.  The indication for injection is pain and inflammation.  The skin overlying the tendon sheath/A1 pulley site was prepared with alcohol and ethyl chloride was sprayed topically.  Sterile technique was used. An injection of 1ml of lidocaine and 6mg of betamethasone was used.  The patient was instructed to call if redness, pain or fever occur.  They ay apply ice for 15 minutes eery hour for the next 12-24 hours as tolerated.  .  The patient understands that it may take 2-5 days to see a noticeable difference.  Sterile Band-Aid was applied.  Pt was given CSI#2 in L MF  surgery option was discussed if L MF triggers again   rto in 4 weeks.

## 2024-09-17 NOTE — HISTORY OF PRESENT ILLNESS
[de-identified] : 9/17/24: Pt reports that left middle finger is triggering again. Pt reports that CSI did not work.   03/26/2024: Patient is here for evaluation of left middle finger. Patient reports trigger finger in the left middle finger and is interested in injection today. There is no hx of trauma.  PMH:  Allergies: NKDA

## 2024-12-19 ENCOUNTER — APPOINTMENT (OUTPATIENT)
Dept: ORTHOPEDIC SURGERY | Facility: CLINIC | Age: 74
End: 2024-12-19
Payer: MEDICARE

## 2024-12-19 DIAGNOSIS — M65.332 TRIGGER FINGER, LEFT MIDDLE FINGER: ICD-10-CM

## 2024-12-19 PROCEDURE — 20551 NJX 1 TENDON ORIGIN/INSJ: CPT | Mod: LT

## 2024-12-19 PROCEDURE — 99213 OFFICE O/P EST LOW 20 MIN: CPT | Mod: 25

## 2025-02-07 ENCOUNTER — APPOINTMENT (OUTPATIENT)
Age: 75
End: 2025-02-07

## 2025-02-18 ENCOUNTER — APPOINTMENT (OUTPATIENT)
Dept: ORTHOPEDIC SURGERY | Facility: CLINIC | Age: 75
End: 2025-02-18

## 2025-05-08 RX ORDER — HYDROCODONE BITARTRATE AND ACETAMINOPHEN 5; 325 MG/1; MG/1
5-325 TABLET ORAL
Qty: 10 | Refills: 0 | Status: ACTIVE | COMMUNITY
Start: 2025-05-08 | End: 1900-01-01

## 2025-05-09 ENCOUNTER — APPOINTMENT (OUTPATIENT)
Age: 75
End: 2025-05-09

## 2025-05-09 PROCEDURE — 26055 INCISE FINGER TENDON SHEATH: CPT | Mod: F2

## 2025-05-09 PROCEDURE — 26055 INCISE FINGER TENDON SHEATH: CPT | Mod: AS,F2

## 2025-05-20 ENCOUNTER — APPOINTMENT (OUTPATIENT)
Dept: ORTHOPEDIC SURGERY | Facility: CLINIC | Age: 75
End: 2025-05-20